# Patient Record
Sex: FEMALE | Race: WHITE
[De-identification: names, ages, dates, MRNs, and addresses within clinical notes are randomized per-mention and may not be internally consistent; named-entity substitution may affect disease eponyms.]

---

## 2017-02-07 ENCOUNTER — HOSPITAL ENCOUNTER (OUTPATIENT)
Dept: HOSPITAL 80 - BMCIMAGING | Age: 77
End: 2017-02-07
Attending: NURSE PRACTITIONER
Payer: COMMERCIAL

## 2017-02-07 DIAGNOSIS — Z12.31: Primary | ICD-10-CM

## 2017-02-07 DIAGNOSIS — Z85.3: ICD-10-CM

## 2017-02-07 DIAGNOSIS — N63: ICD-10-CM

## 2017-02-07 PROCEDURE — G0202 SCR MAMMO BI INCL CAD: HCPCS

## 2017-02-07 NOTE — MA
Screening Digital Mammogram



Clinical Indications: Routine screening. Other with breast cancer at age 61



Technique:  Standard cephalocaudal and mediolateral oblique projections are obtained.  This examinati
on is processed by the [employer] computer aided detection system. 



Comparison: December 2015, December 2014, November 2013 and September 2012





Breast density: B; There are scattered fibroglandular densities.



Findings: CAD was reviewed. . New small vague nodular density lower right breast, possibly in the sli
ghtly inner breast on the CC view. The remainder of the right and left breast are stable.



Impression: New nodule right breast. 



BI-RADS 0. Additional imaging required, right breast.



Recommendation:  Spot compression view and true lateral view. If persistent, proceed to ultrasound fo
r further characterization and localization purposes..



Pending sale to [facility] will send a result letter to the patient.



Negative mammography should not preclude additional workup of a clinically suspicious finding.



The patient's information is entered into a reminder system with a target due date for her next mammo
gram.

## 2017-02-16 ENCOUNTER — HOSPITAL ENCOUNTER (OUTPATIENT)
Dept: HOSPITAL 80 - BMCIMAGING | Age: 77
End: 2017-02-16
Attending: NURSE PRACTITIONER
Payer: COMMERCIAL

## 2017-02-16 DIAGNOSIS — N60.11: Primary | ICD-10-CM

## 2017-02-16 PROCEDURE — G0206 DX MAMMO INCL CAD UNI: HCPCS

## 2017-05-11 ENCOUNTER — HOSPITAL ENCOUNTER (OUTPATIENT)
Dept: HOSPITAL 80 - BMCIMAGING | Age: 77
End: 2017-05-11
Attending: NURSE PRACTITIONER
Payer: COMMERCIAL

## 2017-05-11 DIAGNOSIS — Z13.820: Primary | ICD-10-CM

## 2017-05-11 DIAGNOSIS — M85.80: ICD-10-CM

## 2017-12-26 ENCOUNTER — HOSPITAL ENCOUNTER (OUTPATIENT)
Dept: HOSPITAL 80 - BMCIMAGING | Age: 77
End: 2017-12-26
Attending: PHYSICAL MEDICINE & REHABILITATION
Payer: COMMERCIAL

## 2017-12-26 DIAGNOSIS — R60.0: Primary | ICD-10-CM

## 2018-01-16 ENCOUNTER — HOSPITAL ENCOUNTER (OUTPATIENT)
Dept: HOSPITAL 80 - BMCIMAGING | Age: 78
End: 2018-01-16
Attending: PODIATRIST
Payer: COMMERCIAL

## 2018-01-16 DIAGNOSIS — M19.071: ICD-10-CM

## 2018-01-16 DIAGNOSIS — M79.671: Primary | ICD-10-CM

## 2018-07-17 ENCOUNTER — HOSPITAL ENCOUNTER (OUTPATIENT)
Dept: HOSPITAL 80 - BMCIMAGING | Age: 78
End: 2018-07-17
Attending: RADIOLOGY
Payer: COMMERCIAL

## 2018-07-17 DIAGNOSIS — Z12.31: Primary | ICD-10-CM

## 2018-07-17 DIAGNOSIS — Z80.3: ICD-10-CM

## 2018-07-23 ENCOUNTER — HOSPITAL ENCOUNTER (EMERGENCY)
Dept: HOSPITAL 80 - FED | Age: 78
Discharge: HOME | End: 2018-07-23
Payer: COMMERCIAL

## 2018-07-23 VITALS — SYSTOLIC BLOOD PRESSURE: 142 MMHG | DIASTOLIC BLOOD PRESSURE: 84 MMHG

## 2018-07-23 DIAGNOSIS — T40.2X5A: ICD-10-CM

## 2018-07-23 DIAGNOSIS — K59.03: Primary | ICD-10-CM

## 2018-07-23 DIAGNOSIS — E86.9: ICD-10-CM

## 2018-07-23 NOTE — EDPHY
H & P


Stated Complaint: no BM in 1 weeks-just started new pain med in same timeframe


Time Seen by Provider: 07/23/18 09:38


HPI/ROS: 


HPI:  This is a 78-year-old female who presents with





Chief Complaint: no BM in 1 weeks-just started new pain med in same timeframe





Location:  Abdomen


Quality:  No bowel movement


Duration:  1 week


Signs and Symptoms: no fever, no nausea, no vomiting, no hematemesis, no blood 

in stool, no abdominal bloating, no diarrhea, no back pain, no urinary symptoms

, no vaginal bleeding/discharge, no indigestion, no chest pain, no shortness of 

breath


Timing:  Slowly worsening


Severity:  Mild-to-moderate


Context:  Patient is generally healthy only takes hormone replacement therapy 

but recently started taking and opiate for lower back pain approximately 7-10 

days ago.  She reports that her last bowel movement was 7-8 days ago.  She 

believes that this is related to the pain medication.  She reports that she has 

a decreased appetite and has not ate as much over the last 2 days.  She has 

been taking MiraLax for the last 3-4 days daily without any relief.  She is 

passing flatus.  She denies any abdominal pain, nausea, vomiting, fever, 

urinary symptoms.  She has no history of abdominal surgeries.


Modifying Factors:  MiraLax, no relief 





Comment: 








ROS: see HPI


Constitutional: No fever, no chills, no weight loss


Eyes:  No blurred vision


Respiratory:  No shortness of breath, no cough


Cardiovascular:  No chest pain, no palpitations


Gastrointestinal:  No nausea, no vomiting, no diarrhea, no hematemesis, no 

blood in stool 


Genitourinary:  No dysuria, no blood in urine 


Extremities:  No myalgias, no edema


Neurologic:  No weakness, no numbness


Skin:  No rashes, no petechiae


Hematologic:  No bruising, no bleeding





MEDICAL/SURGICAL/SOCIAL HISTORY: 


Medical history:  Generally healthy.  Low back pain.


Surgical history:  Denies


Social history:  Retired.  Family history noncontributory.





CONSTITUTIONAL:  Well-appearing elderly white female, awake and alert, no 

obvious distress


HEENT: Atraumatic and normocephalic, PERRL, EOMI.  Nares patent; no rhinorrhea;

  no nasal mucosal edema. Tympanic membranes clear. Oropharynx clear, no 

exudate and moist pink mucosa.  Airway patent.  No lymphadenopathy.  No 

meningismus.


Cardiovascular: Normal S1/S2, regular rate, regular rhythm, without murmur rub 

or gallop.


PULMONARY/CHEST:  Symmetrical and nontender. Clear to auscultation bilaterally. 

Good air movement. No accessory muscle usage.


ABDOMEN:  Soft, nondistended, nontender, no rebound, no guarding, no peritoneal 

signs, no masses or organomegaly. No CVAT.  Bowel sounds heard x4 quadrants.


EXTREMITIES:  2/2 pulses, strength 5/5, no deformities, no clubbing, no 

cyanosis or edema.


NEUROLOGICAL: no focal neuro deficits.  GCS 15.


SKIN: Warm and dry, no erythema. no rash.  Good capillary refill. 








Source: Patient


Exam Limitations: No limitations





- Medical/Surgical History


Hx Asthma: No


Hx Chronic Respiratory Disease: No


Hx Diabetes: No


Hx Cardiac Disease: No


Hx Renal Disease: No


Hx Cirrhosis: No


Hx Alcoholism: No


Hx HIV/AIDS: No


Hx Splenectomy or Spleen Trauma: No


Other PMH: none reported





- Social History


Smoking Status: Never smoked


Constitutional: 


 Initial Vital Signs











Temperature (C)  36.7 C   07/23/18 09:33


 


Heart Rate  78   07/23/18 09:33


 


Respiratory Rate  18   07/23/18 09:33


 


Blood Pressure  134/102 H  07/23/18 09:33


 


O2 Sat (%)  93   07/23/18 09:33








 











O2 Delivery Mode               Room Air














Allergies/Adverse Reactions: 


 





Sulfa (Sulfonamide Antibiotics) [Sulfa(Sulfonamide Antibiotics)] Allergy (

Verified 07/23/18 09:32)


 


PRESERVATIVE IN MANY SHOTS Allergy (Uncoded 07/14/12 20:45)


 








Home Medications: 














 Medication  Instructions  Recorded


 


Hormone Replacement Therapy   07/14/12


 


Magnesium Citrate [Magnesium 300 ml PO ONCE #1 bottle 07/23/18





Citrate 300 ml (*)]  


 


Opiod   07/23/18


 


Sod Phos,M-B/Na Phos,Di-Ba [Fleet 266 ml RC ONCE #1 enema 07/23/18





Enema]  














Medical Decision Making





- Diagnostics


Imaging Results: 


 Imaging Impressions





Abdomen X-Ray  07/23/18 09:42


Impression:


1. Mild constipation.


2. Progression of degenerative disk disease and facet hypertrophy mid to lower 

lumbar spine.











ED Course/Re-evaluation: 


Vital signs is stable and patient's abdomen is soft and nontender and passing 

flatus.  I suspect that this is constipation versus ileus.


I do not believe that we need to order laboratory studies are CT abdomen and 

pelvis scan at this time.  I will start with the KUB and start 1 L normal 

saline.


Abdominal x-ray my read shows mild constipation, no signs of obstruction.


Given a prescription for Fleet enema and magnesium citrate. 


Repeat abdominal exam remains soft and nontender. 











This patient was seen under the supervision of my secondary supervising 

physician.  I evaluated care for this patient independently.  Discussed this 

patient with Dr. Escobedo.  





Differential Diagnosis: 


Abdominal pain including but not limited to obstruction, ileus, constipation, 

fecal impaction. 








Departure





- Departure


Disposition: Home, Routine, Self-Care


Clinical Impression: 


 Constipation due to opioid therapy





Condition: Good


Instructions:  Magnesium Citrate (By mouth), Constipation (ED), Fleet Enema (ED)


Additional Instructions: 


Consume a minimum of 8-10 glasses of water or electrolyte fluid replacement 

drinks that include Gatorade, Powerade, Pedialyte. 


Eat a bland diet for the next 48 hours and then slowly advance as tolerated. 


Take magnesium citrate 150 mL and if no bowel movement in 4-6 hours take the 

other 150 mL.


Use fleets enema upon arrival home. 





Return to the Emergency Room if symptoms do not resolve in the next 72 hours, 

you spike a fever > 102  F, or experience intractable abdominal pain/nausea/

vomiting. 


Referrals: 


Jagruti Denton, NP [Primary Care Provider] - 3-4 days, if not improved


Prescriptions: 


Magnesium Citrate [Magnesium Citrate 300 ml (*)] 300 ml PO ONCE #1 bottle


Sod Phos,M-B/Na Phos,Di-Ba [Fleet Enema] 266 ml RC ONCE #1 enema

## 2018-07-25 ENCOUNTER — HOSPITAL ENCOUNTER (OUTPATIENT)
Dept: HOSPITAL 80 - FIMAGING | Age: 78
End: 2018-07-25
Attending: NURSE PRACTITIONER
Payer: COMMERCIAL

## 2018-07-25 DIAGNOSIS — K59.00: Primary | ICD-10-CM

## 2018-07-25 DIAGNOSIS — K62.89: ICD-10-CM
